# Patient Record
Sex: FEMALE | URBAN - METROPOLITAN AREA
[De-identification: names, ages, dates, MRNs, and addresses within clinical notes are randomized per-mention and may not be internally consistent; named-entity substitution may affect disease eponyms.]

---

## 2018-08-30 NOTE — PATIENT DISCUSSION
Pt will think about what post op drops, 3 meds vs. Imprimis, will do her homework on prices and let us know.

## 2019-01-14 NOTE — PATIENT DISCUSSION
ADULT DERMATOLOGY PROGRESS NOTE  NEW VISIT    1/14/2019  Leyda Lopez  1958  MRN: [id number]    Leyda Lopez is a 60 year old female patient who comes in today with the following cutaneous complaints:     #1 Actinic keratoses and sun damage  Location: face   Onset: years    POSITIVE qualities: crusty  NEGATIVE qualities: painful, bleeding, growing, changing and itchy  Treatment:  Previous LN2 treatments by Nicolasa Mendez and Dr. Castellano   - Patient states that the areas have grown back, and she is interested in alternative treatments to cryotherapy     Patient has never had a full body skin exam and is requesting one today.     Patient feels otherwise well with no additional cutaneous concerns today.          PAST DERMATOLOGIC HISTORY:   BCC, left breast, s/p wle, less than 10 years ago     Past Medical History:   Diagnosis Date   • Acute meniscal tear, medial 09/17/2018    left     • Anxiety    • Arthritis    • Baker's cyst of knee     left knee   • Basal cell carcinoma of skin     face    • Depression    • Elevated fasting glucose    • Family history of colon cancer in father    • GERD (gastroesophageal reflux disease)    • Hyperlipidemia 5/18/2015   • Personal history of colonic polyps    • Prolapse of mitral valve    • SVT (supraventricular tachycardia) (CMS/HCC)        FAMILY HISTORY:   History of skin cancer--Type unknown, Mother, Father, Sister    SOCIAL HISTORY:   Tanning Bed Use:  yes     Tobacco use:  yes     ETOH:  yes     Illicit drug use:  no    ALLERGIES:  No Known Allergies    Current Outpatient Medications   Medication Sig Dispense Refill   • pantoprazole (PROTONIX) 40 MG tablet Take 1 tablet by mouth 2 times daily (before meals). 180 tablet 1   • atenolol (TENORMIN) 25 MG tablet Take 1 tablet by mouth daily. 90 tablet 0   • Calcium Carbonate-Vitamin D (CALCIUM-D PO) Take 1 tablet by mouth daily.     • Probiotic Product (PROBIOTIC PO) Take 1 tablet by mouth daily.     • rosuvastatin (CRESTOR) 10 MG  Observe. tablet Take 1 tablet by mouth daily. 90 tablet 3   • cyclobenzaprine (FLEXERIL) 5 MG tablet Take 1 tablet by mouth nightly. 30 tablet 11   • oxyCODONE-acetaminophen (PERCOCET) 5-325 MG per tablet Take 1 tablet by mouth every 4 to 6 hours as needed for pain. 40 tablet 0   • ibuprofen (MOTRIN) 600 MG tablet Take 1 tablet by mouth every 8 hours as needed for Pain. 40 tablet 1     No current facility-administered medications for this visit.        REVIEW OF SYSTEMS:  No nausea, vomiting, fevers, chills, diarrhea, joint pain, weight loss, fatigue, cough, dizziness, weakness, depression, headaches, blood in stool/urine, visual disturbance, decreased appetite.    EXAM:   Visit Vitals  /64 ([place] [facility] [place]: Sitting, Cuff Size: Regular)   Temp 96.3 °F (35.7 °C) (Tympanic)   Ht 5' 4\" (1.626 m)   Wt 48.1 kg   BMI 18.20 kg/m²       The patient is a well developed Chaudhry type 2 female is alert and oriented x 3, normal mood and affect and is in no acute distress. A complete cutaneous examination of the scalp, face, neck, eyelids, mouth, lips, conjunctiva, chest, abdomen, back, bilateral arms, bilateral legs, buttocks, digits and nails was performed. Notable findings include: (Physical exam findings noted in A/P)    IMPRESSION / PLAN:     1) Neoplasm of uncertain behavior (left popliteal fossa with a 6 mm scaly papule)  - r/o ISK vs SCC     PROCEDURE NOTE - SHAVE BIOPSY   Correct patient, procedure, site verified.  Verbal consent obtained.  Risks and benefits of the procedure were discussed including pain, bleeding, infection, scarring, nerve damage.  The site(s) was cleansed with alcohol and anesthetized with 1% lidocaine with epinephrine.  The lesion was removed using a Dermablade.  Hemostasis was achieved using aluminum chloride.  Estimated blood loss less than 0.3 mL. The site was covered with petrolatum ointment and a bandage.  Specimen was labeled and sent to pathology.  Patient instructed to  keep the area clean and dry for 24 hours and wound care instructions were given.  Patient tolerated the procedure well without any complications.  # 1 specimen(s) to pathology    2) Actinic keratoses (pink gritty papules on the right eyelid x1. Additional pink gritty papules on the nasal bridge and scattered on the cheeks)  - discussed diagnosis and that lesions are precancerous, recommend treatment  - treatment options including cryosurgery vs topical chemotherapeutic vs PDT including risks and benefits discussed.  - Patient to watch for the ABCD's of pigmented lesions or persistent crusts, erosions, irritated areas, especially if pink/red. Technique of skin self-exam discussed with patient and given the AAD (American Academy of Dermatology) information on this.   - Observe closely for skin damage/changes, and call if such occurs.  - Recommended PDT for treatment of actinic keratoses on the face. Patient will schedule     Recommended cryotherapy for treatment.  Discussed expected skin response, healing time, pain/discomfort, crusting, possible blister formation and risk of hypopigmentation and scar.  Patient indicated understanding and elected to proceed.  Lesion(s) treated with cryotherapy, one 20-second freeze/thaw cycle to 1 total lesion(s).  Patient tolerated well.  Wound care instructions given and discussed.       2) VALLE ANGIOMATA (Multiple red 2-4mm papules scattered on abdomen)  -nature of the disorder discussed in detail  -clinically benign today on exam, reassurance was given  -continue to monitor for any changes  -call if any changes occur    3) Seborrheic Keratoses (Scattered brown-grey, waxy, hyperkeratotic papules and plaques on the arms)  -nature of the disorder discussed in detail  -clinically benign today on exam, reassurance was given  -continue to monitor for any changes  -call if any changes occur    4) Sun distributed Lentigines (Scattered tan macules on chest and arms)  - Benign,  reassurance.  - Recommend daily physical sunblock SPF30+ with reapplication per 's instructions and photoprotection w/UV-protective clothing and consideration for heliocare polypodium PO; sun protection handout provided    5) Clinically Benign Nevi (Multiple small, evenly colored, brown, regular, well-circumscribed nevi on the abdomen, legs)  -nature of the disorder discussed in detail  -clinically benign today on exam, reassurance was given  -continue to monitor for any changes  -call if any changes occur    6) Vitiligo (depigmented patches on the left lower leg)  -patient with localized vitiligo  - counseled on the etiology of this condition  - call with changes     RTC: pending path, for PDT treatment of the face    Call sooner if any problems or concerns.    Thank you Nando Barcenas MD for allowing us to participate in the care of your patient.    On 1/14/2019, IZeynep scribed the services personally performed by Kenna Fernandez MD     The documentation recorded by the scribe accurately and completely reflects the service(s) I personally performed and the decisions made by me.     Joellen Fernandez MD  [facility]

## 2019-03-11 NOTE — PATIENT DISCUSSION
Despite some risk factors, the patient does not demonstrate definitive evidence of glaucoma at this time. Medicine

## 2019-10-31 ENCOUNTER — IMPORTED ENCOUNTER (OUTPATIENT)
Dept: URBAN - METROPOLITAN AREA CLINIC 9 | Facility: CLINIC | Age: 59
End: 2019-10-31

## 2020-12-02 NOTE — PATIENT DISCUSSION
PLAN: MÓNICA ARAUJO WITH L. Your clinic record indicates that you are due for:   Pap and physical exam and Immunization(s) Td  Q 10 years  Influenza recommended but declined          Thank you for choosing [facility].  You may be receiving an email and/or telephone survey request from [facility] [employer] regarding your visit today.  Please take a few minutes to respond to the survey to let us know how we are doing.      If you have questions or concerns, please contact us via [employer] or you can contact your care team at [phone].    Our Clinic hours are:  Monday 6:40 am  to 7:00 pm  Tuesday -Friday 6:40 am to 5:00 pm    The [place] outpatient lab hours are:  Monday - Friday 6:10 am to 4:45 pm  Saturdays 7:00 am to 11:00 am  Appointments are required, call [phone]    If you have clinical questions after hours or would like to schedule an appointment,  call the clinic at [phone].

## 2021-10-16 ASSESSMENT — TONOMETRY
OS_IOP_MMHG: 15
OD_IOP_MMHG: 14

## 2021-10-16 ASSESSMENT — VISUAL ACUITY
OS_CC: 20/25 - SN
OD_CC: 20/30 SN

## 2022-07-07 RX ORDER — HYDROCODONE BITARTRATE AND ACETAMINOPHEN 7.5; 325 MG/1; MG/1
TABLET ORAL
COMMUNITY

## 2022-07-07 RX ORDER — QUETIAPINE FUMARATE 25 MG/1
TABLET, FILM COATED ORAL
COMMUNITY

## 2022-07-07 RX ORDER — FLUOXETINE HYDROCHLORIDE 40 MG/1
1 CAPSULE ORAL
COMMUNITY
Start: 2022-01-05

## 2022-07-07 RX ORDER — APREMILAST 30 MG/1
TABLET, FILM COATED ORAL
COMMUNITY

## 2022-07-07 RX ORDER — GABAPENTIN 100 MG/1
3 CAPSULE ORAL
COMMUNITY

## 2022-07-07 RX ORDER — VENLAFAXINE HYDROCHLORIDE 37.5 MG/1
CAPSULE, EXTENDED RELEASE ORAL
COMMUNITY
Start: 2021-11-10

## 2022-07-07 RX ORDER — TRAZODONE HYDROCHLORIDE 50 MG/1
TABLET ORAL
COMMUNITY

## 2022-07-07 RX ORDER — VENLAFAXINE HYDROCHLORIDE 150 MG/1
CAPSULE, EXTENDED RELEASE ORAL
COMMUNITY

## 2023-08-10 ENCOUNTER — NEW PATIENT (OUTPATIENT)
Dept: URBAN - METROPOLITAN AREA CLINIC 14 | Facility: CLINIC | Age: 63
End: 2023-08-10

## 2023-08-10 DIAGNOSIS — H35.3122: ICD-10-CM

## 2023-08-10 DIAGNOSIS — H35.3211: ICD-10-CM

## 2023-08-10 PROCEDURE — 92134 CPTRZ OPH DX IMG PST SGM RTA: CPT

## 2023-08-10 PROCEDURE — 99204 OFFICE O/P NEW MOD 45 MIN: CPT

## 2023-08-10 ASSESSMENT — TONOMETRY
OD_IOP_MMHG: 17
OS_IOP_MMHG: 17

## 2023-08-10 ASSESSMENT — VISUAL ACUITY
OD_PH: 20/80
OD_CC: 20/200-1
OS_CC: 20/20-2

## 2023-09-11 ENCOUNTER — ESTABLISHED PATIENT (OUTPATIENT)
Dept: URBAN - METROPOLITAN AREA CLINIC 14 | Facility: CLINIC | Age: 63
End: 2023-09-11

## 2023-09-11 DIAGNOSIS — H25.13: ICD-10-CM

## 2023-09-11 DIAGNOSIS — H35.3122: ICD-10-CM

## 2023-09-11 DIAGNOSIS — H35.3211: ICD-10-CM

## 2023-09-11 PROCEDURE — 67028 INJECTION EYE DRUG: CPT

## 2023-09-11 PROCEDURE — 99214 OFFICE O/P EST MOD 30 MIN: CPT

## 2023-09-11 PROCEDURE — 92134 CPTRZ OPH DX IMG PST SGM RTA: CPT

## 2023-09-11 ASSESSMENT — VISUAL ACUITY
OS_CC: 20/30
OD_CC: 20/80
OS_BCVA: 20/20
OD_BCVA: 20/40

## 2023-09-11 ASSESSMENT — TONOMETRY
OS_IOP_MMHG: 12
OD_IOP_MMHG: 12

## 2023-10-09 ENCOUNTER — ESTABLISHED PATIENT (OUTPATIENT)
Dept: URBAN - METROPOLITAN AREA CLINIC 14 | Facility: CLINIC | Age: 63
End: 2023-10-09

## 2023-10-09 DIAGNOSIS — H35.3122: ICD-10-CM

## 2023-10-09 DIAGNOSIS — H35.3211: ICD-10-CM

## 2023-10-09 PROCEDURE — 67028 INJECTION EYE DRUG: CPT

## 2023-10-09 PROCEDURE — 92134 CPTRZ OPH DX IMG PST SGM RTA: CPT

## 2023-10-09 ASSESSMENT — VISUAL ACUITY
OS_CC: 20/60+1
OD_CC: 20/80+1

## 2023-10-09 ASSESSMENT — TONOMETRY
OD_IOP_MMHG: 10
OS_IOP_MMHG: 11

## 2023-11-27 ENCOUNTER — CLINIC PROCEDURE ONLY (OUTPATIENT)
Dept: URBAN - METROPOLITAN AREA CLINIC 14 | Facility: CLINIC | Age: 63
End: 2023-11-27

## 2023-11-27 DIAGNOSIS — H35.3211: ICD-10-CM

## 2023-11-27 DIAGNOSIS — H35.3122: ICD-10-CM

## 2023-11-27 PROCEDURE — 67028 INJECTION EYE DRUG: CPT

## 2023-11-27 PROCEDURE — 92134 CPTRZ OPH DX IMG PST SGM RTA: CPT

## 2023-11-27 ASSESSMENT — VISUAL ACUITY
OD_CC: 20/100
OS_CC: 20/25+1

## 2023-11-27 ASSESSMENT — TONOMETRY
OS_IOP_MMHG: 13
OD_IOP_MMHG: 12

## 2024-01-25 ENCOUNTER — ESTABLISHED PATIENT (OUTPATIENT)
Dept: URBAN - METROPOLITAN AREA CLINIC 14 | Facility: CLINIC | Age: 64
End: 2024-01-25

## 2024-01-25 DIAGNOSIS — H25.13: ICD-10-CM

## 2024-01-25 DIAGNOSIS — H35.3122: ICD-10-CM

## 2024-01-25 DIAGNOSIS — H35.3211: ICD-10-CM

## 2024-01-25 DIAGNOSIS — H01.023: ICD-10-CM

## 2024-01-25 PROCEDURE — 99214 OFFICE O/P EST MOD 30 MIN: CPT

## 2024-01-25 PROCEDURE — 92134 CPTRZ OPH DX IMG PST SGM RTA: CPT

## 2024-01-25 ASSESSMENT — TONOMETRY
OD_IOP_MMHG: 10
OS_IOP_MMHG: 16

## 2024-01-25 ASSESSMENT — VISUAL ACUITY
OD_SC: 20/80-1
OS_SC: 20/25

## 2024-02-12 ENCOUNTER — CLINIC PROCEDURE ONLY (OUTPATIENT)
Dept: URBAN - METROPOLITAN AREA CLINIC 14 | Facility: CLINIC | Age: 64
End: 2024-02-12

## 2024-02-12 DIAGNOSIS — H35.3122: ICD-10-CM

## 2024-02-12 DIAGNOSIS — H35.3211: ICD-10-CM

## 2024-02-12 PROCEDURE — 67028 INJECTION EYE DRUG: CPT

## 2024-02-12 PROCEDURE — 92134 CPTRZ OPH DX IMG PST SGM RTA: CPT

## 2024-02-12 ASSESSMENT — VISUAL ACUITY
OS_CC: 20/30+2
OD_CC: 20/200

## 2024-02-12 ASSESSMENT — TONOMETRY
OD_IOP_MMHG: 12
OS_IOP_MMHG: 14

## 2024-03-11 ENCOUNTER — CLINIC PROCEDURE ONLY (OUTPATIENT)
Dept: URBAN - METROPOLITAN AREA CLINIC 14 | Facility: CLINIC | Age: 64
End: 2024-03-11

## 2024-03-11 DIAGNOSIS — H35.3211: ICD-10-CM

## 2024-03-11 DIAGNOSIS — H35.3122: ICD-10-CM

## 2024-03-11 PROCEDURE — 92134 CPTRZ OPH DX IMG PST SGM RTA: CPT

## 2024-03-11 PROCEDURE — 67028 INJECTION EYE DRUG: CPT

## 2024-03-11 ASSESSMENT — VISUAL ACUITY
OS_CC: 20/30-1
OD_CC: 20/80+1

## 2024-03-11 ASSESSMENT — TONOMETRY
OD_IOP_MMHG: 14
OS_IOP_MMHG: 16

## 2024-04-08 ENCOUNTER — CLINIC PROCEDURE ONLY (OUTPATIENT)
Dept: URBAN - METROPOLITAN AREA CLINIC 14 | Facility: CLINIC | Age: 64
End: 2024-04-08

## 2024-04-08 DIAGNOSIS — H35.3211: ICD-10-CM

## 2024-04-08 DIAGNOSIS — H35.3122: ICD-10-CM

## 2024-04-08 PROCEDURE — 67028 INJECTION EYE DRUG: CPT

## 2024-04-08 PROCEDURE — 92134 CPTRZ OPH DX IMG PST SGM RTA: CPT

## 2024-04-08 ASSESSMENT — TONOMETRY
OD_IOP_MMHG: 8
OS_IOP_MMHG: 8

## 2024-04-08 ASSESSMENT — VISUAL ACUITY
OS_CC: 20/30-2
OD_CC: 20/60-2